# Patient Record
Sex: FEMALE | Race: WHITE | NOT HISPANIC OR LATINO
[De-identification: names, ages, dates, MRNs, and addresses within clinical notes are randomized per-mention and may not be internally consistent; named-entity substitution may affect disease eponyms.]

---

## 2020-04-26 ENCOUNTER — MESSAGE (OUTPATIENT)
Age: 60
End: 2020-04-26

## 2020-05-04 ENCOUNTER — APPOINTMENT (OUTPATIENT)
Dept: DISASTER EMERGENCY | Facility: HOSPITAL | Age: 60
End: 2020-05-04

## 2020-05-05 LAB
SARS-COV-2 IGG SERPL IA-ACNC: <0.1 INDEX
SARS-COV-2 IGG SERPL QL IA: NEGATIVE

## 2023-01-20 PROBLEM — Z00.00 ENCOUNTER FOR PREVENTIVE HEALTH EXAMINATION: Status: ACTIVE | Noted: 2023-01-20

## 2023-01-25 ENCOUNTER — APPOINTMENT (OUTPATIENT)
Dept: BARIATRICS/WEIGHT MGMT | Facility: CLINIC | Age: 63
End: 2023-01-25
Payer: COMMERCIAL

## 2023-01-25 VITALS
SYSTOLIC BLOOD PRESSURE: 140 MMHG | HEIGHT: 63 IN | BODY MASS INDEX: 32.6 KG/M2 | DIASTOLIC BLOOD PRESSURE: 92 MMHG | WEIGHT: 184 LBS

## 2023-01-25 DIAGNOSIS — Z83.49 FAMILY HISTORY OF OTHER ENDOCRINE, NUTRITIONAL AND METABOLIC DISEASES: ICD-10-CM

## 2023-01-25 DIAGNOSIS — Z86.69 PERSONAL HISTORY OF OTHER DISEASES OF THE NERVOUS SYSTEM AND SENSE ORGANS: ICD-10-CM

## 2023-01-25 PROCEDURE — 99205 OFFICE O/P NEW HI 60 MIN: CPT

## 2023-01-25 RX ORDER — CONTAINER,EMPTY
EACH MISCELLANEOUS
Qty: 1 | Refills: 0 | Status: ACTIVE | COMMUNITY
Start: 2023-01-25 | End: 1900-01-01

## 2023-01-25 RX ORDER — ISOPROPYL ALCOHOL 0.7 ML/ML
SWAB TOPICAL
Qty: 1 | Refills: 2 | Status: ACTIVE | COMMUNITY
Start: 2023-01-25 | End: 1900-01-01

## 2023-01-25 NOTE — ASSESSMENT
[FreeTextEntry1] : 62 year old female with class I obesity, prediabetes, elevated BP and HPL\par Discussed importance of lifestyle changes- committed to mixed cardio and strength training 3x a week- mixture of home and work gyms\par Increase hydration\par Discussed healthy plate, mindful eating- ways to help stop evening snack- avoiding trigger foods, trying another evening activity.  Track on MFP\par Discussed medication options- topiramate, wegovy, metformin- patient decided on wegovy- discussed possible side effects including nausea, vomiting, diarrhea, constipation, GERD, pancreatitis, MTC, gall stones, and dehydration.  Discussed injection technique, storage, and needle safety\par Will refer to RD\par F/U 1 month

## 2023-01-25 NOTE — HISTORY OF PRESENT ILLNESS
[FreeTextEntry1] : 62 year old female self-referred for weight management.\par Patient has struggled with her weight for 7 years.  Has tried WW, nutri-syste, [place], sim fast, IF, Noom in the past\par Prior to menopause 7 years ago weight was 130.\par Job also changed when [employer] took over [facility]- much more stress and added hours\par Food recall- B yogurt greek S grapefruit L skips or salad/soup or sandwich- mostly brought from home S cheese stick D protein/v S- chips/crackers- believes this is her problem time\par Evening eating not due to hunger\par Has home gym- treadmill, elliptical, stair master and weights\par Inadequate water intake\par Good sleep

## 2023-03-01 ENCOUNTER — APPOINTMENT (OUTPATIENT)
Dept: BARIATRICS/WEIGHT MGMT | Facility: CLINIC | Age: 63
End: 2023-03-01
Payer: SELF-PAY

## 2023-03-01 VITALS — WEIGHT: 177.2 LBS | BODY MASS INDEX: 31.39 KG/M2 | SYSTOLIC BLOOD PRESSURE: 129 MMHG | DIASTOLIC BLOOD PRESSURE: 84 MMHG

## 2023-03-01 PROCEDURE — 99214 OFFICE O/P EST MOD 30 MIN: CPT

## 2023-03-05 NOTE — ASSESSMENT
[FreeTextEntry1] : 62 year old female with prediabetes and obesity class I\par Continue Wegovy 0.5mg/week\par Discussed much more aggressive bowel regimen with OTC laxatives- discussed several option to titrate to BMs q1-2 days\par Continue adequate hydration and fiber\par Continue to work with RD\par Continue to build exercise regimen- mixed cardio and strength training\par F/U 1 month

## 2023-03-05 NOTE — HISTORY OF PRESENT ILLNESS
[FreeTextEntry1] : 62 year old female self-referred for weight management.\par Patient has struggled with her weight for 7 years.  Has tried WW, nutri-syste, [place], sim fast, IF, Noom in the past\par Prior to menopause 7 years ago weight was 130.\par Job also changed when [employer] took over [facility]- much more stress and added hours\par Food recall- B yogurt greek S grapefruit L skips or salad/soup or sandwich- mostly brought from home S cheese stick D protein/v S- chips/crackers- believes this is her problem time\par Evening eating not due to hunger\par Has home gym- treadmill, elliptical, stair master and weights\par Inadequate water intake\par Good sleep\par \par 3/1/23- Patient doing well -7 pounds.  1 injection in for 0.5mg wegovy.  +constipation- sometimes BMs q4 days.  Saw Beatriz Trotter RD and is following recommendations.  Adding snack midday to help with hunger.  Exercise 4x a week- [facility] gym or home gym elliptical and treadmill.

## 2023-04-12 ENCOUNTER — APPOINTMENT (OUTPATIENT)
Dept: BARIATRICS/WEIGHT MGMT | Facility: CLINIC | Age: 63
End: 2023-04-12
Payer: COMMERCIAL

## 2023-04-12 VITALS — WEIGHT: 174 LBS | BODY MASS INDEX: 30.82 KG/M2

## 2023-04-12 PROCEDURE — 99213 OFFICE O/P EST LOW 20 MIN: CPT

## 2023-04-12 NOTE — HISTORY OF PRESENT ILLNESS
[FreeTextEntry1] : 62 year old female self-referred for weight management.\par Patient has struggled with her weight for 7 years.  Has tried WW, nutri-syste, [place], sim fast, IF, Noom in the past\par Prior to menopause 7 years ago weight was 130.\par Job also changed when [employer] took over [facility]- much more stress and added hours\par Food recall- B yogurt greek S grapefruit L skips or salad/soup or sandwich- mostly brought from home S cheese stick D protein/v S- chips/crackers- believes this is her problem time\par Evening eating not due to hunger\par Has home gym- treadmill, elliptical, stair master and weights\par Inadequate water intake\par Good sleep\par \par 3/1/23- Patient doing well -7 pounds.  1 injection in for 0.5mg wegovy.  +constipation- sometimes BMs q4 days.  Saw Beatriz Trotter RD and is following recommendations.  Adding snack midday to help with hunger.  Exercise 4x a week- [facility] gym or home gym elliptical and treadmill.  \par \par 4/12/23- Patient -3 pounds.  Feeling well on Wegovy 1mg/week.  Mild constipation resolved with prune juice and miralax PRN, mild GERD if eating late.  Not consistently exercising.  Eating 3 meals/day- yogurt or eggs in AM, salad or soup for lunch protein and vegetable for dinner, good hydration and 7 hours/sleep.

## 2023-04-12 NOTE — ASSESSMENT
[FreeTextEntry1] : 62 year old female with class I obesity and prediabetes\par Labs pending for physical in July (patient's preference)\par Increase Wegovy 1.7mg/week\par Continue to build exercise regimen- home gym vs. hospital gym- online exercise clases- mixed cardio and strength training\par Continue mindful eating- reinforced RD reces\par F/U 1 month

## 2023-05-10 ENCOUNTER — APPOINTMENT (OUTPATIENT)
Dept: BARIATRICS/WEIGHT MGMT | Facility: CLINIC | Age: 63
End: 2023-05-10

## 2023-05-25 ENCOUNTER — APPOINTMENT (OUTPATIENT)
Dept: BARIATRICS/WEIGHT MGMT | Facility: CLINIC | Age: 63
End: 2023-05-25

## 2023-05-25 VITALS — WEIGHT: 163 LBS | BODY MASS INDEX: 28.87 KG/M2

## 2023-07-12 ENCOUNTER — APPOINTMENT (OUTPATIENT)
Dept: BARIATRICS/WEIGHT MGMT | Facility: CLINIC | Age: 63
End: 2023-07-12
Payer: COMMERCIAL

## 2023-07-12 VITALS — SYSTOLIC BLOOD PRESSURE: 136 MMHG | BODY MASS INDEX: 28.27 KG/M2 | WEIGHT: 159.6 LBS | DIASTOLIC BLOOD PRESSURE: 82 MMHG

## 2023-07-12 PROCEDURE — 99213 OFFICE O/P EST LOW 20 MIN: CPT

## 2023-07-13 NOTE — ASSESSMENT
[FreeTextEntry1] : 62 year old female with h/o obesity now overweight and prediabetes\par Continue Wegovy 2.4mg/week\par Continue mindful eating- sufficient in f/v protein sources\par Focus on improving exercise regimen- target 2x a week- can do home gym or work gym- try to make it a priority on weekends and 1 work day/week\par Continue adequate hydration\par F/U 1 month

## 2023-07-13 NOTE — HISTORY OF PRESENT ILLNESS
[FreeTextEntry1] : 62 year old female self-referred for weight management.\par Patient has struggled with her weight for 7 years.  Has tried WW, nutri-syste, [place], sim fast, IF, Noom in the past\par Prior to menopause 7 years ago weight was 130.\par Job also changed when [employer] took over [facility]- much more stress and added hours\par Food recall- B yogurt greek S grapefruit L skips or salad/soup or sandwich- mostly brought from home S cheese stick D protein/v S- chips/crackers- believes this is her problem time\par Evening eating not due to hunger\par Has home gym- treadmill, elliptical, stair master and weights\par Inadequate water intake\par Good sleep\par \par 3/1/23- Patient doing well -7 pounds.  1 injection in for 0.5mg wegovy.  +constipation- sometimes BMs q4 days.  Saw Beatriz Trotter RD and is following recommendations.  Adding snack midday to help with hunger.  Exercise 4x a week- [facility] gym or home gym elliptical and treadmill.  \par \par 4/12/23- Patient -3 pounds.  Feeling well on Wegovy 1mg/week.  Mild constipation resolved with prune juice and miralax PRN, mild GERD if eating late.  Not consistently exercising.  Eating 3 meals/day- yogurt or eggs in AM, salad or soup for lunch protein and vegetable for dinner, good hydration and 7 hours/sleep.  \par \par 7/12/23- Patient doing well- taking Wegovy 2.4mg/week- constipation and nausea has improved.  Eating 3 meals/day.  Not consistently exercising +stressors at work- long days >12 hours

## 2023-08-17 ENCOUNTER — APPOINTMENT (OUTPATIENT)
Dept: BARIATRICS/WEIGHT MGMT | Facility: CLINIC | Age: 63
End: 2023-08-17
Payer: COMMERCIAL

## 2023-08-17 VITALS — WEIGHT: 155 LBS | BODY MASS INDEX: 27.46 KG/M2

## 2023-08-17 DIAGNOSIS — D56.9 THALASSEMIA, UNSPECIFIED: ICD-10-CM

## 2023-08-17 PROCEDURE — 99213 OFFICE O/P EST LOW 20 MIN: CPT | Mod: 95

## 2023-08-17 NOTE — ASSESSMENT
[FreeTextEntry1] : 62 year old female with h/o obesity now overweight Continue Wegovy 2.4mg/week Continue to address work life balance Focus on increasing strengthening regimen Having her urine retested by PCP F/U 2 months

## 2023-08-17 NOTE — HISTORY OF PRESENT ILLNESS
[FreeTextEntry1] : 62 year old female self-referred for weight management. Patient has struggled with her weight for 7 years.  Has tried WW, nutri-syste, [place], sim fast, IF, Noom in the past Prior to menopause 7 years ago weight was 130. Job also changed when [employer] took over [facility]- much more stress and added hours Food recall- B yogurt greek S grapefruit L skips or salad/soup or sandwich- mostly brought from home S cheese stick D protein/v S- chips/crackers- believes this is her problem time Evening eating not due to hunger Has home gym- treadmill, elliptical, stair master and weights Inadequate water intake Good sleep  3/1/23- Patient doing well -7 pounds.  1 injection in for 0.5mg wegovy.  +constipation- sometimes BMs q4 days.  Saw Beatriz Trotter RD and is following recommendations.  Adding snack midday to help with hunger.  Exercise 4x a week- [facility] gym or home gym elliptical and treadmill.    4/12/23- Patient -3 pounds.  Feeling well on Wegovy 1mg/week.  Mild constipation resolved with prune juice and miralax PRN, mild GERD if eating late.  Not consistently exercising.  Eating 3 meals/day- yogurt or eggs in AM, salad or soup for lunch protein and vegetable for dinner, good hydration and 7 hours/sleep.    7/12/23- Patient doing well- taking Wegovy 2.4mg/week- constipation and nausea has improved.  Eating 3 meals/day.  Not consistently exercising +stressors at work- long days >12 hours  8/17/23- Patient doing well -4 pounds (29 overall) no negative side effects of Wegovy 2.4mg/week.  Eating healthier, prioritizing exercise since last conversation- walking and swimming.  Good hydration.

## 2023-08-17 NOTE — REASON FOR VISIT
[Home] : at home, [unfilled] , at the time of the visit. [Medical Office: ([facility])___] : at the medical office located in  [Patient] : the patient [Self] : self [Follow-Up] : a follow-up visit

## 2023-10-19 ENCOUNTER — APPOINTMENT (OUTPATIENT)
Dept: BARIATRICS/WEIGHT MGMT | Facility: CLINIC | Age: 63
End: 2023-10-19
Payer: COMMERCIAL

## 2023-10-19 VITALS — SYSTOLIC BLOOD PRESSURE: 126 MMHG | BODY MASS INDEX: 26.93 KG/M2 | WEIGHT: 152 LBS | DIASTOLIC BLOOD PRESSURE: 78 MMHG

## 2023-10-19 DIAGNOSIS — M85.80 OTHER SPECIFIED DISORDERS OF BONE DENSITY AND STRUCTURE, UNSPECIFIED SITE: ICD-10-CM

## 2023-10-19 PROCEDURE — 99214 OFFICE O/P EST MOD 30 MIN: CPT

## 2023-12-20 ENCOUNTER — RX RENEWAL (OUTPATIENT)
Age: 63
End: 2023-12-20

## 2023-12-20 ENCOUNTER — APPOINTMENT (OUTPATIENT)
Dept: BARIATRICS/WEIGHT MGMT | Facility: CLINIC | Age: 63
End: 2023-12-20
Payer: COMMERCIAL

## 2023-12-20 PROCEDURE — 99213 OFFICE O/P EST LOW 20 MIN: CPT | Mod: 95

## 2023-12-20 NOTE — REASON FOR VISIT
[Other Location: e.g. School (Enter Location, City,State)___] : at [unfilled], at the time of the visit. [Medical Office: ([facility])___] : at the medical office located in  [Patient] : the patient [Self] : self [Follow-Up] : a follow-up visit

## 2023-12-20 NOTE — HISTORY OF PRESENT ILLNESS
[FreeTextEntry1] : 62 year old female self-referred for weight management. Patient has struggled with her weight for 7 years.  Has tried WW, nutri-syste, [place], sim fast, IF, Noom in the past Prior to menopause 7 years ago weight was 130. Job also changed when [employer] took over [facility]- much more stress and added hours Food recall- B yogurt greek S grapefruit L skips or salad/soup or sandwich- mostly brought from home S cheese stick D protein/v S- chips/crackers- believes this is her problem time Evening eating not due to hunger Has home gym- treadmill, elliptical, stair master and weights Inadequate water intake Good sleep  3/1/23- Patient doing well -7 pounds.  1 injection in for 0.5mg wegovy.  +constipation- sometimes BMs q4 days.  Saw Beatriz Trotter RD and is following recommendations.  Adding snack midday to help with hunger.  Exercise 4x a week- [facility] gym or home gym elliptical and treadmill.    4/12/23- Patient -3 pounds.  Feeling well on Wegovy 1mg/week.  Mild constipation resolved with prune juice and miralax PRN, mild GERD if eating late.  Not consistently exercising.  Eating 3 meals/day- yogurt or eggs in AM, salad or soup for lunch protein and vegetable for dinner, good hydration and 7 hours/sleep.    7/12/23- Patient doing well- taking Wegovy 2.4mg/week- constipation and nausea has improved.  Eating 3 meals/day.  Not consistently exercising +stressors at work- long days >12 hours  8/17/23- Patient doing well -4 pounds (29 overall) no negative side effects of Wegovy 2.4mg/week.  Eating healthier, prioritizing exercise since last conversation- walking and swimming.  Good hydration.    10/19/23- Patient doing well.  -3 pounds BMI 26.9- no negative side effects of Wegovy 2.4mg.  Working 4 days/week 10 days and 1 day with her grandson.  Better balance for her.  Has increased exercise.  Eating healthy balanced diet.  Good hydration .  +stress with work  12/20/23- Patient has not weighed herself but believes that she has lost weight.  Exercise 2x a week- cardio glide machine, weights, and walking- wants to build more muscle tone.  +constipation when inadequate hydration.  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

## 2023-12-20 NOTE — ASSESSMENT
[FreeTextEntry1] : 63 year old female with h/o obesity prediabetes Continue Wegovy 2.4mg/week Increase hydration Discussed protein sources- protein shakes/bars in office if unable to grab lunch Increase exercise to 4x a week- cardio and strength F/U 2 months

## 2023-12-21 VITALS — WEIGHT: 146 LBS | BODY MASS INDEX: 25.86 KG/M2

## 2024-02-14 ENCOUNTER — APPOINTMENT (OUTPATIENT)
Dept: BARIATRICS/WEIGHT MGMT | Facility: CLINIC | Age: 64
End: 2024-02-14
Payer: COMMERCIAL

## 2024-02-14 VITALS — BODY MASS INDEX: 25.51 KG/M2 | WEIGHT: 144 LBS

## 2024-02-14 PROCEDURE — 99213 OFFICE O/P EST LOW 20 MIN: CPT

## 2024-02-14 NOTE — HISTORY OF PRESENT ILLNESS
[FreeTextEntry1] : 62 year old female self-referred for weight management. Patient has struggled with her weight for 7 years.  Has tried WW, nutri-syste, [place], sim fast, IF, Noom in the past Prior to menopause 7 years ago weight was 130. Job also changed when [employer] took over [facility]- much more stress and added hours Food recall- B yogurt greek S grapefruit L skips or salad/soup or sandwich- mostly brought from home S cheese stick D protein/v S- chips/crackers- believes this is her problem time Evening eating not due to hunger Has home gym- treadmill, elliptical, stair master and weights Inadequate water intake Good sleep  3/1/23- Patient doing well -7 pounds.  1 injection in for 0.5mg wegovy.  +constipation- sometimes BMs q4 days.  Saw Beatriz Trotter RD and is following recommendations.  Adding snack midday to help with hunger.  Exercise 4x a week- [facility] gym or home gym elliptical and treadmill.    4/12/23- Patient -3 pounds.  Feeling well on Wegovy 1mg/week.  Mild constipation resolved with prune juice and miralax PRN, mild GERD if eating late.  Not consistently exercising.  Eating 3 meals/day- yogurt or eggs in AM, salad or soup for lunch protein and vegetable for dinner, good hydration and 7 hours/sleep.    7/12/23- Patient doing well- taking Wegovy 2.4mg/week- constipation and nausea has improved.  Eating 3 meals/day.  Not consistently exercising +stressors at work- long days >12 hours  8/17/23- Patient doing well -4 pounds (29 overall) no negative side effects of Wegovy 2.4mg/week.  Eating healthier, prioritizing exercise since last conversation- walking and swimming.  Good hydration.    10/19/23- Patient doing well.  -3 pounds BMI 26.9- no negative side effects of Wegovy 2.4mg.  Working 4 days/week 10 days and 1 day with her grandson.  Better balance for her.  Has increased exercise.  Eating healthy balanced diet.  Good hydration .  +stress with work  12/20/23- Patient has not weighed herself but believes that she has lost weight.  Exercise 2x a week- cardio glide machine, weights, and walking- wants to build more muscle tone.  +constipation when inadequate hydration.  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    2/14/24- Had been higher by 1 pound but now -3 pounds since her highest.  Better eating now- exercise 2x a week. No negative side effects of Wegovy 2.4mg/week.  Good hydration.  Better sleep regimen.  Target weight 130's.

## 2024-02-14 NOTE — REASON FOR VISIT
[Other Location: e.g. School (Enter Location, City,State)___] : at [unfilled], at the time of the visit. [Medical Office: ([facility])___] : at the medical office located in  [Patient] : the patient [Self] : self

## 2024-02-14 NOTE — ASSESSMENT
[FreeTextEntry1] : 63 year old female with h/o obesity HPL, prediabetes, osteopenia Increase strength training- exercise goal 3x a week Reinforced healthy eating patterns- track on MFP for best data  Continue adequate hydration and sleep, stress management Continue Wegovy 2.4mg/week for now- can consider Zepbound in the future as needed d/w patient F/U 3 months

## 2024-05-16 ENCOUNTER — APPOINTMENT (OUTPATIENT)
Dept: BARIATRICS/WEIGHT MGMT | Facility: CLINIC | Age: 64
End: 2024-05-16

## 2024-05-30 ENCOUNTER — APPOINTMENT (OUTPATIENT)
Dept: BARIATRICS/WEIGHT MGMT | Facility: CLINIC | Age: 64
End: 2024-05-30
Payer: COMMERCIAL

## 2024-05-30 ENCOUNTER — TRANSCRIPTION ENCOUNTER (OUTPATIENT)
Age: 64
End: 2024-05-30

## 2024-05-30 VITALS — BODY MASS INDEX: 25.15 KG/M2 | WEIGHT: 142 LBS

## 2024-05-30 DIAGNOSIS — E66.3 OVERWEIGHT: ICD-10-CM

## 2024-05-30 DIAGNOSIS — R73.03 PREDIABETES.: ICD-10-CM

## 2024-05-30 DIAGNOSIS — E66.9 OBESITY, UNSPECIFIED: ICD-10-CM

## 2024-05-30 DIAGNOSIS — E78.5 HYPERLIPIDEMIA, UNSPECIFIED: ICD-10-CM

## 2024-05-30 DIAGNOSIS — Z86.39 PERSONAL HISTORY OF OTHER ENDOCRINE, NUTRITIONAL AND METABOLIC DISEASE: ICD-10-CM

## 2024-05-30 PROCEDURE — 99212 OFFICE O/P EST SF 10 MIN: CPT

## 2024-05-30 PROCEDURE — G2211 COMPLEX E/M VISIT ADD ON: CPT | Mod: NC

## 2024-05-30 RX ORDER — ONDANSETRON 4 MG/1
4 TABLET ORAL EVERY 6 HOURS
Qty: 20 | Refills: 0 | Status: DISCONTINUED | COMMUNITY
Start: 2023-05-25 | End: 2024-05-30

## 2024-05-30 RX ORDER — SEMAGLUTIDE 2.4 MG/.75ML
2.4 INJECTION, SOLUTION SUBCUTANEOUS
Qty: 3 | Refills: 2 | Status: ACTIVE | COMMUNITY
Start: 2023-01-25 | End: 1900-01-01

## 2024-05-30 NOTE — ASSESSMENT
[FreeTextEntry1] : 63 year old female with h/o obesity, prediabetes, HPL Patient to continue Wegovy 2.4mg/week Discussed likely body composition changes with strength training and she will f/u with me to stop in and use BCA scale Reinforced healthy eating, hydration, and exercise regimen Labs with her PCP at her physical next month F/U 3 months- sooner PRN

## 2024-05-30 NOTE — HISTORY OF PRESENT ILLNESS
[FreeTextEntry1] : 62 year old female self-referred for weight management. Patient has struggled with her weight for 7 years.  Has tried WW, nutri-syste, [place], sim fast, IF, Noom in the past Prior to menopause 7 years ago weight was 130. Job also changed when [employer] took over [facility]- much more stress and added hours Food recall- B yogurt greek S grapefruit L skips or salad/soup or sandwich- mostly brought from home S cheese stick D protein/v S- chips/crackers- believes this is her problem time Evening eating not due to hunger Has home gym- treadmill, elliptical, stair master and weights Inadequate water intake Good sleep  3/1/23- Patient doing well -7 pounds.  1 injection in for 0.5mg wegovy.  +constipation- sometimes BMs q4 days.  Saw Beatriz Trotter RD and is following recommendations.  Adding snack midday to help with hunger.  Exercise 4x a week- [facility] gym or home gym elliptical and treadmill.    4/12/23- Patient -3 pounds.  Feeling well on Wegovy 1mg/week.  Mild constipation resolved with prune juice and miralax PRN, mild GERD if eating late.  Not consistently exercising.  Eating 3 meals/day- yogurt or eggs in AM, salad or soup for lunch protein and vegetable for dinner, good hydration and 7 hours/sleep.    7/12/23- Patient doing well- taking Wegovy 2.4mg/week- constipation and nausea has improved.  Eating 3 meals/day.  Not consistently exercising +stressors at work- long days >12 hours  8/17/23- Patient doing well -4 pounds (29 overall) no negative side effects of Wegovy 2.4mg/week.  Eating healthier, prioritizing exercise since last conversation- walking and swimming.  Good hydration.    10/19/23- Patient doing well.  -3 pounds BMI 26.9- no negative side effects of Wegovy 2.4mg.  Working 4 days/week 10 days and 1 day with her grandson.  Better balance for her.  Has increased exercise.  Eating healthy balanced diet.  Good hydration .  +stress with work  12/20/23- Patient has not weighed herself but believes that she has lost weight.  Exercise 2x a week- cardio glide machine, weights, and walking- wants to build more muscle tone.  +constipation when inadequate hydration.  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    2/14/24- Had been higher by 1 pound but now -3 pounds since her highest.  Better eating now- exercise 2x a week. No negative side effects of Wegovy 2.4mg/week.  Good hydration.  Better sleep regimen.  Target weight 130's.    5/30/24- Patient -2 pounds.  Eating well, f/v, lean protein.  Not skipping meals.  Occasional piece of cake.  Exercising stair master, weights.  Some constipation- related to less water intake.

## 2024-05-30 NOTE — REASON FOR VISIT
[Other Location: e.g. School (Enter Location, City,State)___] : at [unfilled], at the time of the visit. [Medical Office: ([facility])___] : at the medical office located in  [Patient] : the patient [Self] : self [Follow-Up] : a follow-up visit

## 2024-09-03 ENCOUNTER — RX RENEWAL (OUTPATIENT)
Age: 64
End: 2024-09-03

## 2024-09-04 ENCOUNTER — APPOINTMENT (OUTPATIENT)
Dept: BARIATRICS/WEIGHT MGMT | Facility: CLINIC | Age: 64
End: 2024-09-04
Payer: COMMERCIAL

## 2024-09-04 VITALS — WEIGHT: 135 LBS | BODY MASS INDEX: 23.91 KG/M2

## 2024-09-04 DIAGNOSIS — E66.9 OBESITY, UNSPECIFIED: ICD-10-CM

## 2024-09-04 DIAGNOSIS — R73.03 PREDIABETES.: ICD-10-CM

## 2024-09-04 DIAGNOSIS — Z86.39 PERSONAL HISTORY OF OTHER ENDOCRINE, NUTRITIONAL AND METABOLIC DISEASE: ICD-10-CM

## 2024-09-04 DIAGNOSIS — E78.5 HYPERLIPIDEMIA, UNSPECIFIED: ICD-10-CM

## 2024-09-04 PROCEDURE — 99213 OFFICE O/P EST LOW 20 MIN: CPT

## 2024-09-04 PROCEDURE — G2211 COMPLEX E/M VISIT ADD ON: CPT | Mod: NC

## 2024-09-04 NOTE — REASON FOR VISIT
[Other Location: e.g. School (Enter Location, City,State)___] : at [unfilled], at the time of the visit. [Medical Office: ([facility])___] : at the medical office located in  [Patient] : the patient [Self] : self [Follow-Up] : a follow-up visit

## 2024-09-04 NOTE — ASSESSMENT
[FreeTextEntry1] : Exercise: - Assessment : The patient is currently exercising 3 days a week The patient will start doing some core strengthening exercises as well. - Plan : Encourage the patient to continue with the current exercise routine, including core strengthening exercises to maintain overall strength and balance, and prevent back strain and falls. Diet: - Assessment : The patient is eating 3 meals a day and getting 2 servings of fruit daily. However, the patient may not be getting enough vegetables in their diet. - Plan : - Suggest adding a second serving of vegetables per day, either as a snack or incorporated into meals (e.g., omelets, salads).  - Encourage the patient to continue getting adequate protein at every meal.  - Ensure the patient is drinking 8 cups of water per day.  Side Effects: - Assessment : The patient is experiencing constipation, which has been a significant issue. - Plan : - Suggest increasing fiber intake from vegetables, valerio seeds, flax seeds, and pumpkin/pumpkin seeds  - Consider switching from Wegovy to Zepbound to potentially reduce gastrointestinal side effects, if the constipation persists.  - Alternatively, consider reducing the dosage of Wegovy once the target weight is achieved, as this may also help alleviate constipation.  Follow-up: - Assessment : The patient is doing well overall with weight management with BMI <25  - Plan : - Schedule a follow-up appointment in approximately 3 months (December 4th at 3:30 PM).  - Encourage the patient to reach out if any concerns arise before the next appointment.  - Offer ongoing accountability and support as needed, beyond just medication management.

## 2024-09-04 NOTE — HISTORY OF PRESENT ILLNESS
[FreeTextEntry1] : 62 year old female self-referred for weight management. Patient has struggled with her weight for 7 years.  Has tried WW, nutri-syste, [place], sim fast, IF, Noom in the past Prior to menopause 7 years ago weight was 130. Job also changed when [employer] took over [facility]- much more stress and added hours Food recall- B yogurt greek S grapefruit L skips or salad/soup or sandwich- mostly brought from home S cheese stick D protein/v S- chips/crackers- believes this is her problem time Evening eating not due to hunger Has home gym- treadmill, elliptical, stair master and weights Inadequate water intake Good sleep  3/1/23- Patient doing well -7 pounds.  1 injection in for 0.5mg wegovy.  +constipation- sometimes BMs q4 days.  Saw Beatriz Trotter RD and is following recommendations.  Adding snack midday to help with hunger.  Exercise 4x a week- [facility] gym or home gym elliptical and treadmill.    4/12/23- Patient -3 pounds.  Feeling well on Wegovy 1mg/week.  Mild constipation resolved with prune juice and miralax PRN, mild GERD if eating late.  Not consistently exercising.  Eating 3 meals/day- yogurt or eggs in AM, salad or soup for lunch protein and vegetable for dinner, good hydration and 7 hours/sleep.    7/12/23- Patient doing well- taking Wegovy 2.4mg/week- constipation and nausea has improved.  Eating 3 meals/day.  Not consistently exercising +stressors at work- long days >12 hours  8/17/23- Patient doing well -4 pounds (29 overall) no negative side effects of Wegovy 2.4mg/week.  Eating healthier, prioritizing exercise since last conversation- walking and swimming.  Good hydration.    10/19/23- Patient doing well.  -3 pounds BMI 26.9- no negative side effects of Wegovy 2.4mg.  Working 4 days/week 10 days and 1 day with her grandson.  Better balance for her.  Has increased exercise.  Eating healthy balanced diet.  Good hydration .  +stress with work  12/20/23- Patient has not weighed herself but believes that she has lost weight.  Exercise 2x a week- cardio glide machine, weights, and walking- wants to build more muscle tone.  +constipation when inadequate hydration.  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    2/14/24- Had been higher by 1 pound but now -3 pounds since her highest.  Better eating now- exercise 2x a week. No negative side effects of Wegovy 2.4mg/week.  Good hydration.  Better sleep regimen.  Target weight 130's.    5/30/24- Patient -2 pounds.  Eating well, f/v, lean protein.  Not skipping meals.  Occasional piece of cake.  Exercising stair master, weights.  Some constipation- related to less water intake.    9/4/24- Feeling well- weekly constipation treated with MOM.  -7 pounds.  Would like to lose an additional 8 pounds.  BMI <25 now.  Good fruit intake and hydration.  1 serving of vegetables/day.  Exercise 3x a week- elliptical, walking and arm strength.

## 2024-09-26 ENCOUNTER — NON-APPOINTMENT (OUTPATIENT)
Age: 64
End: 2024-09-26

## 2024-12-04 ENCOUNTER — APPOINTMENT (OUTPATIENT)
Dept: BARIATRICS/WEIGHT MGMT | Facility: CLINIC | Age: 64
End: 2024-12-04

## 2025-01-15 ENCOUNTER — APPOINTMENT (OUTPATIENT)
Dept: BARIATRICS/WEIGHT MGMT | Facility: CLINIC | Age: 65
End: 2025-01-15

## 2025-01-15 VITALS — BODY MASS INDEX: 24.09 KG/M2 | WEIGHT: 136 LBS

## 2025-01-15 DIAGNOSIS — E66.811 OBESITY, CLASS 1: ICD-10-CM

## 2025-01-15 DIAGNOSIS — M81.0 AGE-RELATED OSTEOPOROSIS W/OUT CURRENT PATHOLOGICAL FRACTURE: ICD-10-CM

## 2025-01-15 DIAGNOSIS — E78.5 HYPERLIPIDEMIA, UNSPECIFIED: ICD-10-CM

## 2025-01-15 DIAGNOSIS — R73.03 PREDIABETES.: ICD-10-CM

## 2025-01-15 PROCEDURE — 99213 OFFICE O/P EST LOW 20 MIN: CPT | Mod: 95

## 2025-01-15 RX ORDER — TOPIRAMATE 25 MG/1
25 TABLET, FILM COATED ORAL
Qty: 60 | Refills: 5 | Status: ACTIVE | COMMUNITY
Start: 2025-01-15 | End: 1900-01-01

## 2025-02-12 ENCOUNTER — NON-APPOINTMENT (OUTPATIENT)
Age: 65
End: 2025-02-12

## 2025-03-06 ENCOUNTER — NON-APPOINTMENT (OUTPATIENT)
Age: 65
End: 2025-03-06

## 2025-03-31 ENCOUNTER — NON-APPOINTMENT (OUTPATIENT)
Age: 65
End: 2025-03-31

## 2025-04-02 ENCOUNTER — APPOINTMENT (OUTPATIENT)
Dept: BARIATRICS/WEIGHT MGMT | Facility: CLINIC | Age: 65
End: 2025-04-02
Payer: COMMERCIAL

## 2025-04-02 VITALS
BODY MASS INDEX: 24.55 KG/M2 | SYSTOLIC BLOOD PRESSURE: 131 MMHG | HEART RATE: 93 BPM | WEIGHT: 138.6 LBS | DIASTOLIC BLOOD PRESSURE: 83 MMHG

## 2025-04-02 DIAGNOSIS — R73.03 PREDIABETES.: ICD-10-CM

## 2025-04-02 DIAGNOSIS — E66.811 OBESITY, CLASS 1: ICD-10-CM

## 2025-04-02 DIAGNOSIS — E78.5 HYPERLIPIDEMIA, UNSPECIFIED: ICD-10-CM

## 2025-04-02 PROCEDURE — 99213 OFFICE O/P EST LOW 20 MIN: CPT

## 2025-04-02 PROCEDURE — G2211 COMPLEX E/M VISIT ADD ON: CPT | Mod: NC

## 2025-04-02 RX ORDER — METFORMIN ER 500 MG 500 MG/1
500 TABLET ORAL DAILY
Qty: 120 | Refills: 2 | Status: ACTIVE | COMMUNITY
Start: 2025-04-02 | End: 1900-01-01

## 2025-07-16 ENCOUNTER — APPOINTMENT (OUTPATIENT)
Dept: BARIATRICS/WEIGHT MGMT | Facility: CLINIC | Age: 65
End: 2025-07-16
Payer: COMMERCIAL

## 2025-07-16 VITALS
WEIGHT: 151.4 LBS | HEART RATE: 80 BPM | BODY MASS INDEX: 26.82 KG/M2 | SYSTOLIC BLOOD PRESSURE: 129 MMHG | DIASTOLIC BLOOD PRESSURE: 80 MMHG

## 2025-07-16 PROCEDURE — 99214 OFFICE O/P EST MOD 30 MIN: CPT

## 2025-08-12 ENCOUNTER — RX RENEWAL (OUTPATIENT)
Age: 65
End: 2025-08-12